# Patient Record
Sex: MALE | URBAN - METROPOLITAN AREA
[De-identification: names, ages, dates, MRNs, and addresses within clinical notes are randomized per-mention and may not be internally consistent; named-entity substitution may affect disease eponyms.]

---

## 2020-11-05 ENCOUNTER — APPOINTMENT (RX ONLY)
Dept: URBAN - METROPOLITAN AREA CLINIC 100 | Facility: CLINIC | Age: 85
Setting detail: DERMATOLOGY
End: 2020-11-05

## 2020-11-05 DIAGNOSIS — L30.4 ERYTHEMA INTERTRIGO: ICD-10-CM

## 2020-11-05 PROCEDURE — 99201: CPT

## 2020-11-05 PROCEDURE — ? RECOMMENDATIONS

## 2020-11-05 PROCEDURE — ? ADDITIONAL NOTES

## 2020-11-05 PROCEDURE — ? COUNSELING

## 2020-11-05 ASSESSMENT — LOCATION DETAILED DESCRIPTION DERM: LOCATION DETAILED: PERIANAL SKIN

## 2020-11-05 ASSESSMENT — LOCATION ZONE DERM: LOCATION ZONE: ANUS

## 2020-11-05 ASSESSMENT — LOCATION SIMPLE DESCRIPTION DERM: LOCATION SIMPLE: PERIANAL SKIN

## 2020-11-05 NOTE — PROCEDURE: RECOMMENDATIONS
Recommendations (Free Text): Mix ketoconazole and hydrocortisone 2.5 % cream and apply twice daily for two weeks. \\nWash area with cetaphil cleanser. \\nAfter area is healed, use aquaphor as a skin protectant.
Detail Level: Zone
Recommendation Preamble: The following recommendations were made during the visit: